# Patient Record
Sex: MALE | Race: BLACK OR AFRICAN AMERICAN | NOT HISPANIC OR LATINO | Employment: FULL TIME | URBAN - METROPOLITAN AREA
[De-identification: names, ages, dates, MRNs, and addresses within clinical notes are randomized per-mention and may not be internally consistent; named-entity substitution may affect disease eponyms.]

---

## 2017-04-27 ENCOUNTER — HOSPITAL ENCOUNTER (EMERGENCY)
Facility: HOSPITAL | Age: 62
Discharge: HOME | End: 2017-04-27
Attending: EMERGENCY MEDICINE
Payer: COMMERCIAL

## 2017-04-27 VITALS
RESPIRATION RATE: 20 BRPM | DIASTOLIC BLOOD PRESSURE: 75 MMHG | HEIGHT: 75 IN | HEART RATE: 76 BPM | SYSTOLIC BLOOD PRESSURE: 125 MMHG | WEIGHT: 227 LBS | BODY MASS INDEX: 28.23 KG/M2 | TEMPERATURE: 98 F | OXYGEN SATURATION: 98 %

## 2017-04-27 DIAGNOSIS — R52 PAIN: ICD-10-CM

## 2017-04-27 DIAGNOSIS — M16.0 PRIMARY OSTEOARTHRITIS OF BOTH HIPS: Primary | ICD-10-CM

## 2017-04-27 PROCEDURE — 63600175 PHARM REV CODE 636 W HCPCS: Performed by: EMERGENCY MEDICINE

## 2017-04-27 PROCEDURE — 99283 EMERGENCY DEPT VISIT LOW MDM: CPT | Mod: 25

## 2017-04-27 PROCEDURE — 96372 THER/PROPH/DIAG INJ SC/IM: CPT

## 2017-04-27 PROCEDURE — 25000003 PHARM REV CODE 250: Performed by: EMERGENCY MEDICINE

## 2017-04-27 RX ORDER — HYDROCODONE BITARTRATE AND ACETAMINOPHEN 10; 325 MG/1; MG/1
1 TABLET ORAL
Status: COMPLETED | OUTPATIENT
Start: 2017-04-27 | End: 2017-04-27

## 2017-04-27 RX ORDER — HYDROCODONE BITARTRATE AND ACETAMINOPHEN 7.5; 325 MG/1; MG/1
1 TABLET ORAL EVERY 6 HOURS PRN
Qty: 20 TABLET | Refills: 0 | Status: SHIPPED | OUTPATIENT
Start: 2017-04-27

## 2017-04-27 RX ORDER — DEXAMETHASONE SODIUM PHOSPHATE 4 MG/ML
12 INJECTION, SOLUTION INTRA-ARTICULAR; INTRALESIONAL; INTRAMUSCULAR; INTRAVENOUS; SOFT TISSUE
Status: COMPLETED | OUTPATIENT
Start: 2017-04-27 | End: 2017-04-27

## 2017-04-27 RX ORDER — CELECOXIB 200 MG/1
200 CAPSULE ORAL 2 TIMES DAILY
Qty: 30 CAPSULE | Refills: 2 | Status: SHIPPED | OUTPATIENT
Start: 2017-04-27

## 2017-04-27 RX ADMIN — HYDROCODONE BITARTRATE AND ACETAMINOPHEN 1 TABLET: 10; 325 TABLET ORAL at 07:04

## 2017-04-27 RX ADMIN — DEXAMETHASONE SODIUM PHOSPHATE 12 MG: 4 INJECTION, SOLUTION INTRAMUSCULAR; INTRAVENOUS at 07:04

## 2017-04-27 NOTE — ED NOTES
Pt sitting up in High Fowlers position and reports that pain in right hip is at 7/10 when sitting up high and gets worse when he lies flat or supine.

## 2017-04-27 NOTE — ED AVS SNAPSHOT
[facility]  [address]  [place] [address]               Mauro Ty   2017  6:00 PM   ED    Description:  Male : 1955   Department:  [facility]           Your Care was Coordinated By:     Provider Role From To    Jah Rogers MD Attending Provider 17 [phone] --      Reason for Visit     Hip Pain           Diagnoses this Visit        Comments    Primary osteoarthritis of both hips    -  Primary     Pain           ED Disposition     ED Disposition Condition Comment    Discharge             To Do List           Follow-up Information     Follow up with Luis Fernando Kolb MD.    Specialty:  Orthopedic Surgery    Contact information:    [address]  [address]  [place] [id number] [phone]         These Medications        Disp Refills Start End    hydrocodone-acetaminophen 7.5-325mg (NORCO) 7.5-325 mg per tablet 20 tablet 0 2017     Take 1 tablet by mouth every 6 (six) hours as needed. - Oral    celecoxib (CELEBREX) 200 MG capsule 30 capsule 2 2017     Take 1 capsule (200 mg total) by mouth 2 (two) times daily. - Oral      [facility]     [facility] On Call Nurse Care Line -  Assistance  Unless otherwise directed by your provider, please contact [facility], our nurse care line that is available for  assistance.     Registered nurses in the [facility] On Call Center provide: appointment scheduling, clinical advisement, health education, and other advisory services.  Call: [phone] (toll free)               Medications           START taking these NEW medications        Refills    hydrocodone-acetaminophen 7.5-325mg (NORCO) 7.5-325 mg per tablet 0    Sig: Take 1 tablet by mouth every 6 (six) hours as needed.    Class: Print    Route: Oral    celecoxib (CELEBREX) 200 MG capsule 2    Sig: Take 1 capsule (200 mg total) by mouth 2 (two) times daily.    Class: Print    Route: Oral      These medications  "were administered today        Dose Freq    dexamethasone injection 12 mg 12 mg ED 1 Time    Sig: Inject 3 mLs (12 mg total) into the muscle ED 1 Time.    Class: Normal    Route: Intramuscular    hydrocodone-acetaminophen 10-325mg per tablet 1 tablet 1 tablet ED 1 Time    Sig: Take 1 tablet by mouth ED 1 Time.    Class: Normal    Route: Oral           Verify that the below list of medications is an accurate representation of the medications you are currently taking.  If none reported, the list may be blank. If incorrect, please contact your healthcare provider. Carry this list with you in case of emergency.           Current Medications     celecoxib (CELEBREX) 200 MG capsule Take 1 capsule (200 mg total) by mouth 2 (two) times daily.    hydrocodone-acetaminophen 7.5-325mg (NORCO) 7.5-325 mg per tablet Take 1 tablet by mouth every 6 (six) hours as needed.           Clinical Reference Information           Your Vitals Were     BP Pulse Temp Resp Height Weight    125/75 (BP Location: Right arm, Patient Position: Sitting, BP Method: Automatic) 76 98.4 °F (36.9 °C) (Oral) 20 6' 3" (1.905 m) 103 kg (227 lb)    SpO2 BMI             98% 28.37 kg/m2         Allergies as of 4/27/2017     No Known Allergies      Immunizations Administered on Date of Encounter - 4/27/2017     None      ED Micro, Lab, POCT     None      ED Imaging Orders     Start Ordered       Status Ordering Provider    04/27/17 1811 04/27/17 1810  X-Ray Hip 2 View Right  1 time imaging      Final result     04/27/17 1810 04/27/17 1810  X-Ray Pelvis Routine AP  1 time imaging      Final result         Discharge Instructions         Osteoarthritis  Osteoarthritis (also called degenerative joint disease) happens when the cartilage in a joint becomes damaged and worn. This may be due to age, wear and tear, overuse of the joint, or other problems. Osteoarthritis can affect any joint. But it is most common in hands, knees, spine, hips, and feet. Symptoms include " joint stiffness, pain, and swelling.  Home care  · When a joint is more sore than usual, rest it for a day or two.  · Heat can help relieve stiffness. Take a hot bath or apply a heating pad for up to 30 minutes at a time. If symptoms are worse in the morning, using heat just after awakening can help relax the muscle and soothe the joints.   · Ice helps relieve pain and swelling. It is often used after activity. Use a cold pack wrapped in a thin cloth on the joint for 10-15 minutes at a time.   · Alternating hot and cold can also help relieve pain. Try this for 20 minutes at a time, several times per day.  · Exercise helps prevent the muscles and ligaments around the joint from becoming weak. It also helps maintain function in the joint.  Be as active as you can. Talk to your doctor about what activity program is best for you.  · Excess weight puts a lot of extra strain on weight-bearing joints of the lower back, hips, knees, feet and ankles. If you are overweight, talk to your doctor about a safe and effective weight loss program.  · Use anti-inflammatory medications as prescribed for pain. This incudes acetaminophen or NSAIDs such as ibuprofen or naproxen. If needed, topical or injected medications may be recommended. Talk to your doctor if these options are not enough to manage your pain.  · Talk with your doctor about devices that might help improve your function and reduce pain.  Follow-up care  Follow up with your doctor as advised by our staff.  When to seek medical attention  Call your doctor right away if any of the following occur:  · Redness or swelling of a painful joint  · Discharge or pus from a painful joint  · Fever of 100.4ºF (38ºC) or higher, or as directed by your healthcare provider  · Worsening joint pain  · Decreased ability to move the joint or bear weight on the joint  Date Last Reviewed: 4/13/2015  © 9926-3545 [employer]. [address] [place] [address], [place] [address]. All rights  reserved. This information is not intended as a substitute for professional medical care. Always follow your healthcare professional's instructions.          [facility] Sign-Up     Activating your [facility] account is as easy as 1-2-3!     1) Visit [email or web address], select Sign Up Now, enter this activation code and your date of birth, then select Next.  [email or web address]  Expires: 6/11/2017  7:39 PM      2) Create a username and password to use when you visit [facility] in the future and select a security question in case you lose your password and select Next.    3) Enter your e-mail address and click Sign Up!    Additional Information  If you have questions, please e-mail [email or web address] or call [phone] to talk to our [facility] staff. Remember, [facility] is NOT to be used for urgent needs. For medical emergencies, dial 911.         Smoking Cessation     If you would like to quit smoking:   You may be eligible for free services if you are a [place] resident and started smoking cigarettes before September 1, 1988.  Call the Smoking Cessation Trust (SCT) toll free at (904) 128-7676 or (812) 049-6102.   Call 5-405-QUIT-NOW if you do not meet the above criteria.   Contact us via email: [email or web address]   View our website for more information: [email or web address]         [facility] complies with applicable Federal civil rights laws and does not discriminate on the basis of race, color, national origin, age, disability, or sex.        Language Assistance Services     ATTENTION: Language assistance services are available, free of charge. Please call [phone].      ATENCIÓN: Si habla español, tiene a cruz disposición servicios gratuitos de asistencia lingüística. Llame al [phone].     CHÚ Ý: N?u b?n nói Ti?ng Vi?t, có các d?ch v? h? tr? ngôn ng? mi?n phí dành cho b?n. G?i s? [phone].

## 2017-04-28 DIAGNOSIS — Z01.818 PREOP EXAMINATION: Primary | ICD-10-CM

## 2017-04-28 NOTE — DISCHARGE INSTRUCTIONS
Osteoarthritis  Osteoarthritis (also called degenerative joint disease) happens when the cartilage in a joint becomes damaged and worn. This may be due to age, wear and tear, overuse of the joint, or other problems. Osteoarthritis can affect any joint. But it is most common in hands, knees, spine, hips, and feet. Symptoms include joint stiffness, pain, and swelling.  Home care  · When a joint is more sore than usual, rest it for a day or two.  · Heat can help relieve stiffness. Take a hot bath or apply a heating pad for up to 30 minutes at a time. If symptoms are worse in the morning, using heat just after awakening can help relax the muscle and soothe the joints.   · Ice helps relieve pain and swelling. It is often used after activity. Use a cold pack wrapped in a thin cloth on the joint for 10-15 minutes at a time.   · Alternating hot and cold can also help relieve pain. Try this for 20 minutes at a time, several times per day.  · Exercise helps prevent the muscles and ligaments around the joint from becoming weak. It also helps maintain function in the joint.  Be as active as you can. Talk to your doctor about what activity program is best for you.  · Excess weight puts a lot of extra strain on weight-bearing joints of the lower back, hips, knees, feet and ankles. If you are overweight, talk to your doctor about a safe and effective weight loss program.  · Use anti-inflammatory medications as prescribed for pain. This incudes acetaminophen or NSAIDs such as ibuprofen or naproxen. If needed, topical or injected medications may be recommended. Talk to your doctor if these options are not enough to manage your pain.  · Talk with your doctor about devices that might help improve your function and reduce pain.  Follow-up care  Follow up with your doctor as advised by our staff.  When to seek medical attention  Call your doctor right away if any of the following occur:  · Redness or swelling of a painful  joint  · Discharge or pus from a painful joint  · Fever of 100.4ºF (38ºC) or higher, or as directed by your healthcare provider  · Worsening joint pain  · Decreased ability to move the joint or bear weight on the joint  Date Last Reviewed: 4/13/2015  © 8415-8507 [employer]. [address] [place] [address], [place] [address]. All rights reserved. This information is not intended as a substitute for professional medical care. Always follow your healthcare professional's instructions.

## 2017-04-28 NOTE — ED PROVIDER NOTES
Encounter Date: 4/27/2017       History     Chief Complaint   Patient presents with    Hip Pain     Pt c/o sudden onset of pain to right hip while sitting eating dinner, pt assisted out of POV, pt guarding R hip, pt with diaphoretic skin when attempting to bear weight.  Pt denies any trauma.      Review of patient's allergies indicates:  No Known Allergies  Patient is a 61 y.o. male presenting with the following complaint: leg pain. The history is provided by the patient.   Leg Pain    The incident occurred at home. There was no injury mechanism. The incident occurred today. The pain is present in the right hip. The pain is at a severity of 10/10. The pain has been constant since onset. Associated symptoms include inability to bear weight. Pertinent negatives include no numbness, no loss of motion, no muscle weakness, no loss of sensation and no tingling. He reports no foreign bodies present. The symptoms are aggravated by bearing weight. He has tried nothing for the symptoms.     No past medical history on file.  No past surgical history on file.  Family History   Problem Relation Age of Onset    Cancer Mother     Cancer Father      Social History   Substance Use Topics    Smoking status: Current Every Day Smoker     Packs/day: 0.50    Smokeless tobacco: Not on file    Alcohol use Yes      Comment: beer socially     Review of Systems   Musculoskeletal: Positive for arthralgias.   Neurological: Negative for tingling and numbness.       Physical Exam   Initial Vitals   BP Pulse Resp Temp SpO2   04/27/17 1811 04/27/17 1811 04/27/17 1811 04/27/17 1811 04/27/17 1811   119/83 72 20 98.5 °F (36.9 °C) 97 %     Physical Exam    Nursing note and vitals reviewed.  Constitutional: He appears well-developed and well-nourished.   HENT:   Head: Normocephalic and atraumatic.   Eyes: EOM are normal.   Neck: Normal range of motion. Neck supple.   Cardiovascular: Normal rate, regular rhythm, normal heart sounds and intact  distal pulses.   Pulmonary/Chest: Breath sounds normal.   Abdominal: Soft.   Musculoskeletal:        Right hip: He exhibits decreased range of motion, decreased strength and tenderness. He exhibits no bony tenderness, no swelling, no crepitus, no deformity and no laceration.   Neurological: He is alert and oriented to person, place, and time.   Skin: Skin is warm and dry.   Psychiatric: He has a normal mood and affect. His behavior is normal. Judgment and thought content normal.         ED Course   Procedures  Labs Reviewed - No data to display       X-Rays:   Independently Interpreted Readings:   Other Readings:  DJTRISTAN    Medical Decision Making:   Clinical Tests:   Radiological Study: Ordered and Reviewed                   ED Course     Clinical Impression:   The primary encounter diagnosis was Primary osteoarthritis of both hips. A diagnosis of Pain was also pertinent to this visit.    Disposition:   Disposition: Discharged  Condition: Stable       Patti Stratton MD  04/27/17 1940

## 2019-05-30 ENCOUNTER — HOSPITAL ENCOUNTER (EMERGENCY)
Facility: HOSPITAL | Age: 64
Discharge: HOME | End: 2019-05-30
Attending: EMERGENCY MEDICINE
Payer: COMMERCIAL

## 2019-05-30 VITALS
HEART RATE: 70 BPM | WEIGHT: 235 LBS | RESPIRATION RATE: 19 BRPM | TEMPERATURE: 98 F | HEIGHT: 76 IN | DIASTOLIC BLOOD PRESSURE: 75 MMHG | BODY MASS INDEX: 28.62 KG/M2 | SYSTOLIC BLOOD PRESSURE: 110 MMHG | OXYGEN SATURATION: 96 %

## 2019-05-30 DIAGNOSIS — T67.5XXA HEAT EXHAUSTION, INITIAL ENCOUNTER: Primary | ICD-10-CM

## 2019-05-30 DIAGNOSIS — R07.9 CHEST PAIN: ICD-10-CM

## 2019-05-30 LAB
ALBUMIN SERPL BCP-MCNC: 3.9 G/DL (ref 3.5–5.2)
ALP SERPL-CCNC: 56 U/L (ref 55–135)
ALT SERPL W/O P-5'-P-CCNC: 18 U/L (ref 10–44)
ANION GAP SERPL CALC-SCNC: 9 MMOL/L (ref 8–16)
AST SERPL-CCNC: 29 U/L (ref 10–40)
BASOPHILS # BLD AUTO: 0.01 K/UL (ref 0–0.2)
BASOPHILS NFR BLD: 0.2 % (ref 0–1.9)
BILIRUB SERPL-MCNC: 0.8 MG/DL (ref 0.1–1)
BILIRUB UR QL STRIP: NEGATIVE
BUN SERPL-MCNC: 16 MG/DL (ref 8–23)
CALCIUM SERPL-MCNC: 9.3 MG/DL (ref 8.7–10.5)
CHLORIDE SERPL-SCNC: 109 MMOL/L (ref 95–110)
CK SERPL-CCNC: 158 U/L (ref 20–200)
CLARITY UR: CLEAR
CO2 SERPL-SCNC: 22 MMOL/L (ref 23–29)
COLOR UR: YELLOW
CREAT SERPL-MCNC: 1.4 MG/DL (ref 0.5–1.4)
DIFFERENTIAL METHOD: ABNORMAL
EOSINOPHIL # BLD AUTO: 0 K/UL (ref 0–0.5)
EOSINOPHIL NFR BLD: 0.2 % (ref 0–8)
ERYTHROCYTE [DISTWIDTH] IN BLOOD BY AUTOMATED COUNT: 14.5 % (ref 11.5–14.5)
EST. GFR  (AFRICAN AMERICAN): >60 ML/MIN/1.73 M^2
EST. GFR  (NON AFRICAN AMERICAN): 54 ML/MIN/1.73 M^2
GLUCOSE SERPL-MCNC: 109 MG/DL (ref 70–110)
GLUCOSE UR QL STRIP: NEGATIVE
HCT VFR BLD AUTO: 43.6 % (ref 40–54)
HGB BLD-MCNC: 15.3 G/DL (ref 14–18)
HGB UR QL STRIP: NEGATIVE
KETONES UR QL STRIP: NEGATIVE
LEUKOCYTE ESTERASE UR QL STRIP: NEGATIVE
LYMPHOCYTES # BLD AUTO: 1 K/UL (ref 1–4.8)
LYMPHOCYTES NFR BLD: 16.6 % (ref 18–48)
MCH RBC QN AUTO: 31.4 PG (ref 27–31)
MCHC RBC AUTO-ENTMCNC: 35.1 G/DL (ref 32–36)
MCV RBC AUTO: 90 FL (ref 82–98)
MONOCYTES # BLD AUTO: 0.5 K/UL (ref 0.3–1)
MONOCYTES NFR BLD: 7.3 % (ref 4–15)
NEUTROPHILS # BLD AUTO: 4.7 K/UL (ref 1.8–7.7)
NEUTROPHILS NFR BLD: 75.7 % (ref 38–73)
NITRITE UR QL STRIP: NEGATIVE
PH UR STRIP: 6 [PH] (ref 5–8)
PLATELET # BLD AUTO: 176 K/UL (ref 150–350)
PMV BLD AUTO: 11.2 FL (ref 9.2–12.9)
POTASSIUM SERPL-SCNC: 3.8 MMOL/L (ref 3.5–5.1)
PROT SERPL-MCNC: 7.8 G/DL (ref 6–8.4)
PROT UR QL STRIP: ABNORMAL
RBC # BLD AUTO: 4.87 M/UL (ref 4.6–6.2)
SODIUM SERPL-SCNC: 140 MMOL/L (ref 136–145)
SP GR UR STRIP: 1.02 (ref 1–1.03)
TROPONIN I SERPL DL<=0.01 NG/ML-MCNC: <0.006 NG/ML (ref 0–0.03)
URN SPEC COLLECT METH UR: ABNORMAL
UROBILINOGEN UR STRIP-ACNC: NEGATIVE EU/DL
WBC # BLD AUTO: 6.14 K/UL (ref 3.9–12.7)

## 2019-05-30 PROCEDURE — 82550 ASSAY OF CK (CPK): CPT

## 2019-05-30 PROCEDURE — 96361 HYDRATE IV INFUSION ADD-ON: CPT

## 2019-05-30 PROCEDURE — 81003 URINALYSIS AUTO W/O SCOPE: CPT

## 2019-05-30 PROCEDURE — 93010 ELECTROCARDIOGRAM REPORT: CPT | Mod: ,,, | Performed by: INTERNAL MEDICINE

## 2019-05-30 PROCEDURE — 25000003 PHARM REV CODE 250: Performed by: EMERGENCY MEDICINE

## 2019-05-30 PROCEDURE — 84484 ASSAY OF TROPONIN QUANT: CPT

## 2019-05-30 PROCEDURE — 85025 COMPLETE CBC W/AUTO DIFF WBC: CPT

## 2019-05-30 PROCEDURE — 93005 ELECTROCARDIOGRAM TRACING: CPT

## 2019-05-30 PROCEDURE — 93010 EKG 12-LEAD: ICD-10-PCS | Mod: ,,, | Performed by: INTERNAL MEDICINE

## 2019-05-30 PROCEDURE — 96360 HYDRATION IV INFUSION INIT: CPT

## 2019-05-30 PROCEDURE — 80053 COMPREHEN METABOLIC PANEL: CPT

## 2019-05-30 PROCEDURE — 99285 EMERGENCY DEPT VISIT HI MDM: CPT | Mod: 25

## 2019-05-30 RX ADMIN — SODIUM CHLORIDE 1000 ML: 0.9 INJECTION, SOLUTION INTRAVENOUS at 04:05

## 2019-05-30 NOTE — ED PROVIDER NOTES
SCRIBE #1 NOTE: I, Demetrio Beth, am scribing for, and in the presence of, Mary Dee Do, MD. I have scribed the entire note.       History     Chief Complaint   Patient presents with    Fatigue     near syncope and weakness after working outside.  hypotensive per EMS     Review of patient's allergies indicates:  No Known Allergies      History of Present Illness     HPI    2019, 3:37 PM  History obtained from the patient      History of Present Illness: Mauro Ty is a 63 y.o. male patient who presents to the Emergency Department for evaluation of fatigue which onset gradually just PTA. Pt states he was working outside since 0900 and that he had not drink until 2 hours ago and that he did not eat all day. Pt states he became weak, dizzy, and EMS states pt was hypotensive on arrival. Symptoms are constant and moderate in severity. No mitigating or exacerbating factors reported. No other sxs reported. Patient denies any syncope, headache, numbness, tingling, chest pain, SOB, abdominal pain, nausea, vomiting, and all other sxs at this time. No further complaints or concerns at this time.         Arrival mode: EMS      PCP: Primary Doctor No        Past Medical History:  History reviewed. No pertinent past medical history.    Past Surgical History:  History reviewed. No pertinent surgical history.      Family History:  Family History   Problem Relation Age of Onset    Cancer Mother     Cancer Father        Social History:  Social History     Tobacco Use    Smoking status: Former Smoker     Packs/day: 0.50     Last attempt to quit: 2018     Years since quittin.2   Substance and Sexual Activity    Alcohol use: Yes     Comment: 2-3 beer a day except      Drug use: No    Sexual activity: Not on file        Review of Systems     Review of Systems   Constitutional: Positive for fatigue. Negative for fever.   HENT: Negative for sore throat.    Respiratory: Negative for shortness of breath.  "   Cardiovascular: Negative for chest pain.   Gastrointestinal: Negative for abdominal pain, nausea and vomiting.   Genitourinary: Negative for dysuria.   Musculoskeletal: Negative for back pain.   Skin: Negative for rash.   Neurological: Positive for dizziness. Negative for syncope, weakness, numbness and headaches.   Hematological: Does not bruise/bleed easily.   All other systems reviewed and are negative.       Physical Exam     Initial Vitals [05/30/19 1442]   BP Pulse Resp Temp SpO2   115/70 73 18 97.8 °F (36.6 °C) 98 %      MAP       --          Physical Exam  Nursing Notes and Vital Signs Reviewed.  Constitutional: Patient is in no acute distress. Well-developed and well-nourished.  Head: Atraumatic. Normocephalic.  Eyes: PERRL. EOM intact. Conjunctivae are not pale. No scleral icterus.  ENT: Mucous membranes are moist. Oropharynx is clear and symmetric.    Neck: Supple. Full ROM. No lymphadenopathy.  Cardiovascular: Regular rate. Regular rhythm. No murmurs, rubs, or gallops. Distal pulses are 2+ and symmetric.  Pulmonary/Chest: No respiratory distress. Clear to auscultation bilaterally. No wheezing or rales.  Abdominal: Soft and non-distended.  There is no tenderness.  No rebound, guarding, or rigidity. Good bowel sounds.  Genitourinary: No CVA tenderness  Musculoskeletal: Moves all extremities. No obvious deformities. No edema.  Skin: Warm and dry.  Neurological:  Alert, awake, and appropriate.  Normal speech.  No acute focal neurological deficits are appreciated.  Psychiatric: Normal affect. Good eye contact. Appropriate in content.     ED Course   Procedures  ED Vital Signs:  Vitals:    05/30/19 1442 05/30/19 1502 05/30/19 1507 05/30/19 1604   BP: 115/70   107/78   Pulse: 73  77 66   Resp: 18      Temp: 97.8 °F (36.6 °C)      TempSrc: Oral      SpO2: 98%      Weight:  106.6 kg (235 lb 0.2 oz)     Height:  6' 4" (1.93 m)      05/30/19 1605 05/30/19 1606   BP: 101/68 109/73   Pulse: 77 80   Resp:     Temp: "     TempSrc:     SpO2:     Weight:     Height:         Abnormal Lab Results:  Labs Reviewed   CBC W/ AUTO DIFFERENTIAL - Abnormal; Notable for the following components:       Result Value    Mean Corpuscular Hemoglobin 31.4 (*)     Gran% 75.7 (*)     Lymph% 16.6 (*)     All other components within normal limits   COMPREHENSIVE METABOLIC PANEL - Abnormal; Notable for the following components:    CO2 22 (*)     eGFR if non  54 (*)     All other components within normal limits   URINALYSIS, REFLEX TO URINE CULTURE - Abnormal; Notable for the following components:    Protein, UA Trace (*)     All other components within normal limits    Narrative:     Preferred Collection Type->Urine, Clean Catch   TROPONIN I   CK        All Lab Results:  Results for orders placed or performed during the hospital encounter of 05/30/19   CBC auto differential   Result Value Ref Range    WBC 6.14 3.90 - 12.70 K/uL    RBC 4.87 4.60 - 6.20 M/uL    Hemoglobin 15.3 14.0 - 18.0 g/dL    Hematocrit 43.6 40.0 - 54.0 %    Mean Corpuscular Volume 90 82 - 98 fL    Mean Corpuscular Hemoglobin 31.4 (H) 27.0 - 31.0 pg    Mean Corpuscular Hemoglobin Conc 35.1 32.0 - 36.0 g/dL    RDW 14.5 11.5 - 14.5 %    Platelets 176 150 - 350 K/uL    MPV 11.2 9.2 - 12.9 fL    Gran # (ANC) 4.7 1.8 - 7.7 K/uL    Lymph # 1.0 1.0 - 4.8 K/uL    Mono # 0.5 0.3 - 1.0 K/uL    Eos # 0.0 0.0 - 0.5 K/uL    Baso # 0.01 0.00 - 0.20 K/uL    Gran% 75.7 (H) 38.0 - 73.0 %    Lymph% 16.6 (L) 18.0 - 48.0 %    Mono% 7.3 4.0 - 15.0 %    Eosinophil% 0.2 0.0 - 8.0 %    Basophil% 0.2 0.0 - 1.9 %    Differential Method Automated    Comprehensive metabolic panel   Result Value Ref Range    Sodium 140 136 - 145 mmol/L    Potassium 3.8 3.5 - 5.1 mmol/L    Chloride 109 95 - 110 mmol/L    CO2 22 (L) 23 - 29 mmol/L    Glucose 109 70 - 110 mg/dL    BUN, Bld 16 8 - 23 mg/dL    Creatinine 1.4 0.5 - 1.4 mg/dL    Calcium 9.3 8.7 - 10.5 mg/dL    Total Protein 7.8 6.0 - 8.4 g/dL    Albumin  3.9 3.5 - 5.2 g/dL    Total Bilirubin 0.8 0.1 - 1.0 mg/dL    Alkaline Phosphatase 56 55 - 135 U/L    AST 29 10 - 40 U/L    ALT 18 10 - 44 U/L    Anion Gap 9 8 - 16 mmol/L    eGFR if African American >60 >60 mL/min/1.73 m^2    eGFR if non African American 54 (A) >60 mL/min/1.73 m^2   Troponin I #1   Result Value Ref Range    Troponin I <0.006 0.000 - 0.026 ng/mL   CPK   Result Value Ref Range     20 - 200 U/L   Urinalysis, Reflex to Urine Culture Urine, Clean Catch   Result Value Ref Range    Specimen UA Urine, Clean Catch     Color, UA Yellow Yellow, Straw, Alanna    Appearance, UA Clear Clear    pH, UA 6.0 5.0 - 8.0    Specific Gravity, UA 1.020 1.005 - 1.030    Protein, UA Trace (A) Negative    Glucose, UA Negative Negative    Ketones, UA Negative Negative    Bilirubin (UA) Negative Negative    Occult Blood UA Negative Negative    Nitrite, UA Negative Negative    Urobilinogen, UA Negative <2.0 EU/dL    Leukocytes, UA Negative Negative         Imaging Results:  Imaging Results          X-Ray Chest PA And Lateral (Final result)  Result time 05/30/19 15:59:48    Final result by Joe Sterling MD (05/30/19 15:59:48)                 Impression:      No acute findings.      Electronically signed by: Joe Sterling MD  Date:    05/30/2019  Time:    15:59             Narrative:    EXAMINATION:  XR CHEST PA AND LATERAL    CLINICAL HISTORY:  Chest Pain;    TECHNIQUE:  PA and lateral views of the chest were performed.    COMPARISON:  None    FINDINGS:  The cardiac and mediastinal silhouettes appear within normal limits. Aortic tortuosity.  The lungs are clear bilaterally.  No acute osseous findings demonstrated.                                 The EKG was ordered, reviewed, and independently interpreted by the ED provider.  Interpretation time:1550  Rate: 71 BPM  Rhythm: normal sinus rhythm  Interpretation: Septal infarct. No STEMI.  When compared to EKG performed 27-April 2017, nonspecific T wave abnormality no longer  evident in inferior leads.            The Emergency Provider reviewed the vital signs and test results, which are outlined above.     ED Discussion     5:39 PM: Reassessed pt at this time.  Pt states his condition has improved at this time. Discussed with pt all pertinent ED information and results. Discussed pt dx and plan of tx. Gave pt all f/u and return to the ED instructions. All questions and concerns were addressed at this time. Pt expresses understanding of information and instructions, and is comfortable with plan to discharge. Pt is stable for discharge.    I discussed with patient and/or family/caretaker that evaluation in the ED does not suggest any emergent or life threatening medical conditions requiring immediate intervention beyond what was provided in the ED, and I believe patient is safe for discharge.  Regardless, an unremarkable evaluation in the ED does not preclude the development or presence of a serious of life threatening condition. As such, patient was instructed to return immediately for any worsening or change in current symptoms.      ED Medication(s):  Medications   sodium chloride 0.9% bolus 1,000 mL (1,000 mLs Intravenous New Bag 5/30/19 9948)       New Prescriptions    No medications on file       Follow-up Information     Primary Doctor No In 2 days.                       Medical Decision Making:   Clinical Tests:   Lab Tests: Ordered and Reviewed  Radiological Study: Ordered and Reviewed  Medical Tests: Ordered and Reviewed             Scribe Attestation:   Scribe #1: I performed the above scribed service and the documentation accurately describes the services I performed. I attest to the accuracy of the note.     Attending:   Physician Attestation Statement for Scribe #1: I, Mary Dee Do, MD, personally performed the services described in this documentation, as scribed by Demetrio Campos, in my presence, and it is both accurate and complete.           Clinical Impression        ICD-10-CM ICD-9-CM   1. Heat exhaustion, initial encounter T67.5XXA 992.5   2. Chest pain R07.9 786.50       Disposition:   Disposition: Discharged  Condition: Stable         Mary Dee Do, MD  05/30/19 1822       Mary Dee Do, MD  05/30/19 1845

## 2020-01-28 DIAGNOSIS — R52 PAIN: Primary | ICD-10-CM

## 2021-08-22 NOTE — ED NOTES
Portable xrays completed.    Patient received to room 25, from [facility], s/p 2 witnessed seizures. Per group home staff, pt. was just dc'ed from [facility] and had two seizures upon arrival back to [facility]. Currently at baseline. 100% on room air, respirations even and unlabored. MD at bedside.